# Patient Record
Sex: MALE | Race: WHITE | URBAN - METROPOLITAN AREA
[De-identification: names, ages, dates, MRNs, and addresses within clinical notes are randomized per-mention and may not be internally consistent; named-entity substitution may affect disease eponyms.]

---

## 2021-10-11 ENCOUNTER — HISTORICAL (OUTPATIENT)
Dept: ADMINISTRATIVE | Facility: HOSPITAL | Age: 61
End: 2021-10-11

## 2021-10-12 ENCOUNTER — HISTORICAL (OUTPATIENT)
Dept: CARDIOLOGY | Facility: HOSPITAL | Age: 61
End: 2021-10-12

## 2022-04-30 NOTE — OP NOTE
Patient:   Tod Bradshaw             MRN: [id number]            FIN: [id number]               Age:   61 years     Sex:  Male     :  1960   Associated Diagnoses:   None   Author:   Leandro Bearden MD      Operative Note   Operative Information   Date/ Time:  10/12/2021 12:07:00.     Procedures Performed:   1.  Comprehensive EP study with attempted induction of arrhythmia  2.  Left atrial pacing and recording from coronary sinus catheter  3.  Programmed stimulation pacing after IV drug infusion  4.  3D mapping  5.  Segmental pulmonary vein isolation for persistent atrial fibrillation  6.  Intracardiac echo  7.  Transseptal puncture  8.  Ultrasound-guided central venous access  9.  Deployment of Vascade MVP venous access closure devices  .     Preoperative Diagnosis:   1. Persistent AF  2. History of Tyical Atrial Flutter ablation.     Postoperative Diagnosis:   1. Persistent AF  2. History of Tyical Atrial Flutter ablation.     Surgeon: Leandro Bearden MD.     Esimated blood loss: loss less than  15  cc.     Description of Procedure/Findings/    Complications:   Summary of procedure:  After risks, benefits, and alternatives were explained the patient, informed consent was obtained.  The patient brought to the EP lab in a fasting and nonsedated state.  Sedation for the procedure was accomplished by the anesthesia team.  The bilateral groins were prepped and draped in usual sterile fashion.  Using 1% lidocaine the bilateral groins were anesthetized.  Using ultrasound guidance and the modified Seldinger technique, access was obtained to the right femoral vein on 3 separate occasions and the left femoral vein on a single occasion.  I placed a 7 Panamanian sheath, 8 Panamanian sheath, and versa cross steerable sheath through the right femoral vein as well as a long 10 [place]amanian sheath through the left femoral vein.  A [employer] decapolar CS catheter was advanced through the 7 [place]amanian sheath and placed into the coronary  sinus for left atrial pacing and recording.  The intracardiac echo catheter and CRD 2 catheter were then placed in the right atrium.  The intracardiac echo catheter was used to visualize the interatrial septum and left atrium.  The CRD 2 catheters placed in the His bundle region for His bundle recording.  Baseline measurements were obtained.    With the use of fluoroscopic and intracardiac echo guidance I performed a single transseptal puncture with the use of the versa cross steerable sheath and versa cross pigtail wire.  Successful puncture was confirmed on intracardiac echo.  The steerable sheath was advanced over the pigtail wire into the left atrium and the wire and dilator were removed from the body.  A left atrial mean pressure of 18 mmHg was measured.    Of note, after all venous access have been obtained the patient received a 6000 unit heparin bolus.  After successful transseptal puncture the patient received another 6000 unit heparin bolus and was started heparin drip.  The heparin was titrated throughout the procedure for goal ACT of 300 to 400 seconds.    The CRD 2 catheter was removed from the body and the 8 Telugu sheath was exchanged for a fixed curve versa cross sheath.  With the technique described above a second transseptal puncture was performed.  Successful puncture was confirmed on intracardiac echo.  The sheath was advanced over the wire and dilator into left atrium.  The wire and dilator were then removed in the body.  I then advanced a circular mapping catheter through the fixed sheath into the left atrium.  With the use of the 3D mapping system I then created anatomy and voltage mapping of the left atrium, left appendage, and pulmonary veins.  The mapping catheter was then placed into the left superior pulmonary vein.  I then advanced an AbbottTachticatheter DF curve irrigated ablation catheter through the steerable sheath into the left atrium.  I then proceeded with attempted wide area  circumferential ablation of the left-sided pulmonary veins.  However, I was unable to completely isolated place the left superior pulmonary vein.  With ablation through the chico between the 2 left-sided pulmonary veins isolation was achieved of the left superior pulmonary vein.  The mapping catheter was then placed into the left inferior pulmonary vein which is also found to be isolated.  The mapping catheter was then advanced into the right superior pulmonary vein.  I then proceeded with wide area sequential ablation of the right-sided pulmonary veins.  However, again, I was unable to achieve isolation.  I then performed ablation through the chico and the right superior pulmonary vein was isolated.  The mapping catheter was then placed into the right inferior pulmonary vein which demonstrated continued conduction.  With further ablation in the chico the right inferior pulmonary vein was also isolated.  The ablation catheter was then placed into the right superior pulmonary vein.  When I performed pacing just prior to the administration of IV adenosine there was return of conduction to the right inferior pulmonary vein.  With further ablation in the chico, isolation was again achieved.  After monitoring the patient for 10 to 15 minutes, the ablation catheter was repositioned into the right superior pulmonary vein.  The patient was then given 18 mg of IV adenosine to hyper depolarize the atrial tissue and evaluate for reconnection conduction.  Both veins remain isolated.  The mapping catheter was then repositioned into the left superior pulmonary vein and the ablation catheter was placed in the left inferior pulmonary vein.  The patient was again given 18 mg of IV adenosine, and both veins remain isolated.  I then performed the post ablation atrial study with burst pacing, decremental pacing, and atrial extrastimuli.    The mapping catheter was removed the body and the fixed curve sheath was withdrawn into the  inferior vena cava.  The steerable sheath and ablation catheter were withdrawn into the right atrium.  The heparin drip was discontinued.  The ablation catheter was placed in the His bundle region for post ablation measurements.  The ablation catheter was then placed along the lateral tricuspid valve annulus and of performed pacing from both the lateral tricuspid valve annulus as well as the coronary sinus to evaluate for cavotricuspid isthmus conduction.  Bidirectional block persisted from the prior ablation procedure.  The steerable sheath was withdrawn into the inferior vena cava and the ablation catheter was removed in the body.  The intracardiac echo catheter was advanced into the right ventricle to visualize for pericardial effusion.  There is no significant pericardial effusion found.    The catheters were then removed the body and the sheaths were flushed.  The patient was given 50 mg of IV protamine for reversal heparinization.  Both versa cross sheaths were exchanged for short 9 Kyr[place]z sheaths in the long 10 [place]yr[place]z sheath was exchanged for a short 10 [place]yr[place]z sheath.  The sheaths were flushed and removed from the body with the placement of Vascade MVP closure devices.  Hemostasis was adequately obtained with manual compression and the devices at all 4 access points.    The patient be transferred to the post anesthesia care unit and then to  short stay for further monitoring.    3D mapping was used throughout the procedure to create anatomy and voltage mapping of the left atrium, left atrial appendage, and pulmonary veins as well as to padmini location the catheters and ablation lesions.    Baseline measurements:  Sinus node: The sinus cycle length was 970 ms  AV node: The AH interval was 85 ms and the HV was 58 ms  Ventricle: The QRS duration was 85 ms and the QT was 404 ms    Ablation: I performed 148 ablation lesions for total time of 22 minutes and 5 seconds.  The average temperature was 34 °C and average  power was 39 W.  I did use high output short duration ablation lesions with a max power output of 50 W and max temperature setting of 40 °C.  The ablation lesions were limited to a duration of 10 seconds, impedance drop of greater than 15%, and/or LSI of 5.  I did decrease the power output to 30 W when ablating along the left atrial posterior wall.  The esophageal temperatures were monitored throughout the procedure.    Post ablation study:  Sinus node: Sinus cycle length was 950 ms  AV node: The AH interval was 83 ms and HV was 55 ms  Ventricle: The QRS duration was 99 ms and the QT was 450 ms    Atrial study: The AV block cycle length was found at 370 ms.  The atrial ERP was found at 250 ms after a drive train of 600 ms.    Transisthmus conduction: The transisthmus conduction time with pacing from the proximal coronary sinus to the lateral tricuspid valve annulus was 125 ms, and with pacing from the lateral tricuspid valve annulus to the proximal coronary sinus was 129 ms.    Impression:  1.  Persistent atrial fibrillation  2.  Pulmonary vein isolation with segmental ablation  3.  Continued bidirectional block through the cavotricuspid isthmus    Plan:  We will monitor patient for complications from the procedure.  He will be discharged home later on today.  I will have patient restart full anticoagulation therapy this evening.  Patient will follow up with me as previously scheduled..     Complications: None.